# Patient Record
Sex: MALE | Race: BLACK OR AFRICAN AMERICAN | NOT HISPANIC OR LATINO | URBAN - METROPOLITAN AREA
[De-identification: names, ages, dates, MRNs, and addresses within clinical notes are randomized per-mention and may not be internally consistent; named-entity substitution may affect disease eponyms.]

---

## 2020-01-20 ENCOUNTER — EMERGENCY (EMERGENCY)
Facility: HOSPITAL | Age: 35
LOS: 1 days | End: 2020-01-20
Attending: STUDENT IN AN ORGANIZED HEALTH CARE EDUCATION/TRAINING PROGRAM
Payer: COMMERCIAL

## 2020-01-20 VITALS
DIASTOLIC BLOOD PRESSURE: 115 MMHG | OXYGEN SATURATION: 96 % | HEIGHT: 70 IN | WEIGHT: 195.11 LBS | RESPIRATION RATE: 20 BRPM | SYSTOLIC BLOOD PRESSURE: 172 MMHG | TEMPERATURE: 98 F | HEART RATE: 74 BPM

## 2020-01-20 VITALS
SYSTOLIC BLOOD PRESSURE: 119 MMHG | RESPIRATION RATE: 20 BRPM | OXYGEN SATURATION: 94 % | DIASTOLIC BLOOD PRESSURE: 63 MMHG | HEART RATE: 58 BPM | TEMPERATURE: 99 F

## 2020-01-20 LAB
ALBUMIN SERPL ELPH-MCNC: 4.4 G/DL — SIGNIFICANT CHANGE UP (ref 3.3–5)
ALP SERPL-CCNC: 42 U/L — SIGNIFICANT CHANGE UP (ref 40–120)
ALT FLD-CCNC: 22 U/L — SIGNIFICANT CHANGE UP (ref 10–45)
ANION GAP SERPL CALC-SCNC: 11 MMOL/L — SIGNIFICANT CHANGE UP (ref 5–17)
AST SERPL-CCNC: 28 U/L — SIGNIFICANT CHANGE UP (ref 10–40)
BASOPHILS # BLD AUTO: 0.01 K/UL — SIGNIFICANT CHANGE UP (ref 0–0.2)
BASOPHILS NFR BLD AUTO: 0.3 % — SIGNIFICANT CHANGE UP (ref 0–2)
BILIRUB SERPL-MCNC: 1.2 MG/DL — SIGNIFICANT CHANGE UP (ref 0.2–1.2)
BLD GP AB SCN SERPL QL: NEGATIVE — SIGNIFICANT CHANGE UP
BUN SERPL-MCNC: 9 MG/DL — SIGNIFICANT CHANGE UP (ref 7–23)
CALCIUM SERPL-MCNC: 9.6 MG/DL — SIGNIFICANT CHANGE UP (ref 8.4–10.5)
CHLORIDE SERPL-SCNC: 105 MMOL/L — SIGNIFICANT CHANGE UP (ref 96–108)
CO2 SERPL-SCNC: 30 MMOL/L — SIGNIFICANT CHANGE UP (ref 22–31)
CREAT SERPL-MCNC: 1.15 MG/DL — SIGNIFICANT CHANGE UP (ref 0.5–1.3)
EOSINOPHIL # BLD AUTO: 0.07 K/UL — SIGNIFICANT CHANGE UP (ref 0–0.5)
EOSINOPHIL NFR BLD AUTO: 1.8 % — SIGNIFICANT CHANGE UP (ref 0–6)
GLUCOSE SERPL-MCNC: 74 MG/DL — SIGNIFICANT CHANGE UP (ref 70–99)
HCT VFR BLD CALC: 44.9 % — SIGNIFICANT CHANGE UP (ref 39–50)
HCT VFR BLD CALC: 47.4 % — SIGNIFICANT CHANGE UP (ref 39–50)
HGB BLD-MCNC: 14.6 G/DL — SIGNIFICANT CHANGE UP (ref 13–17)
HGB BLD-MCNC: 15.1 G/DL — SIGNIFICANT CHANGE UP (ref 13–17)
IMM GRANULOCYTES NFR BLD AUTO: 0.3 % — SIGNIFICANT CHANGE UP (ref 0–1.5)
LYMPHOCYTES # BLD AUTO: 2.09 K/UL — SIGNIFICANT CHANGE UP (ref 1–3.3)
LYMPHOCYTES # BLD AUTO: 53.2 % — HIGH (ref 13–44)
MCHC RBC-ENTMCNC: 27.2 PG — SIGNIFICANT CHANGE UP (ref 27–34)
MCHC RBC-ENTMCNC: 31.9 GM/DL — LOW (ref 32–36)
MCV RBC AUTO: 85.4 FL — SIGNIFICANT CHANGE UP (ref 80–100)
MONOCYTES # BLD AUTO: 0.37 K/UL — SIGNIFICANT CHANGE UP (ref 0–0.9)
MONOCYTES NFR BLD AUTO: 9.4 % — SIGNIFICANT CHANGE UP (ref 2–14)
NEUTROPHILS # BLD AUTO: 1.38 K/UL — LOW (ref 1.8–7.4)
NEUTROPHILS NFR BLD AUTO: 35 % — LOW (ref 43–77)
NRBC # BLD: 0 /100 WBCS — SIGNIFICANT CHANGE UP (ref 0–0)
PLATELET # BLD AUTO: 191 K/UL — SIGNIFICANT CHANGE UP (ref 150–400)
POTASSIUM SERPL-MCNC: 4.1 MMOL/L — SIGNIFICANT CHANGE UP (ref 3.5–5.3)
POTASSIUM SERPL-SCNC: 4.1 MMOL/L — SIGNIFICANT CHANGE UP (ref 3.5–5.3)
PROT SERPL-MCNC: 7.2 G/DL — SIGNIFICANT CHANGE UP (ref 6–8.3)
RBC # BLD: 5.55 M/UL — SIGNIFICANT CHANGE UP (ref 4.2–5.8)
RBC # FLD: 12.5 % — SIGNIFICANT CHANGE UP (ref 10.3–14.5)
RH IG SCN BLD-IMP: POSITIVE — SIGNIFICANT CHANGE UP
SODIUM SERPL-SCNC: 146 MMOL/L — HIGH (ref 135–145)
WBC # BLD: 3.93 K/UL — SIGNIFICANT CHANGE UP (ref 3.8–10.5)
WBC # FLD AUTO: 3.93 K/UL — SIGNIFICANT CHANGE UP (ref 3.8–10.5)

## 2020-01-20 PROCEDURE — 86900 BLOOD TYPING SEROLOGIC ABO: CPT

## 2020-01-20 PROCEDURE — 85027 COMPLETE CBC AUTOMATED: CPT

## 2020-01-20 PROCEDURE — 74177 CT ABD & PELVIS W/CONTRAST: CPT | Mod: 26

## 2020-01-20 PROCEDURE — 86850 RBC ANTIBODY SCREEN: CPT

## 2020-01-20 PROCEDURE — 85014 HEMATOCRIT: CPT

## 2020-01-20 PROCEDURE — 99284 EMERGENCY DEPT VISIT MOD MDM: CPT

## 2020-01-20 PROCEDURE — 99284 EMERGENCY DEPT VISIT MOD MDM: CPT | Mod: 25

## 2020-01-20 PROCEDURE — 80053 COMPREHEN METABOLIC PANEL: CPT

## 2020-01-20 PROCEDURE — 85018 HEMOGLOBIN: CPT

## 2020-01-20 PROCEDURE — 86901 BLOOD TYPING SEROLOGIC RH(D): CPT

## 2020-01-20 PROCEDURE — 74177 CT ABD & PELVIS W/CONTRAST: CPT

## 2020-01-20 RX ORDER — AER TRAVELER 0.5 G/1
1 SOLUTION RECTAL; TOPICAL ONCE
Refills: 0 | Status: DISCONTINUED | OUTPATIENT
Start: 2020-01-20 | End: 2020-02-06

## 2020-01-20 RX ORDER — IBUPROFEN 200 MG
400 TABLET ORAL ONCE
Refills: 0 | Status: COMPLETED | OUTPATIENT
Start: 2020-01-20 | End: 2020-01-20

## 2020-01-20 RX ORDER — ACETAMINOPHEN 500 MG
650 TABLET ORAL ONCE
Refills: 0 | Status: COMPLETED | OUTPATIENT
Start: 2020-01-20 | End: 2020-01-20

## 2020-01-20 RX ADMIN — Medication 400 MILLIGRAM(S): at 12:37

## 2020-01-20 RX ADMIN — Medication 650 MILLIGRAM(S): at 12:37

## 2020-01-20 NOTE — ED PROVIDER NOTE - PROGRESS NOTE DETAILS
Patient evaluated by surgery at the bedside, recommend CT abd/pelv with IV contrast to r/o intrasphincteric abscess. If no abscess can be d/c to follow up with Dr. Dyer in the clinic on Wednesday as earliest can get surgery is on Thursday. CT without abscess, repeat hgb 14.6, will d/c home for f/u with surgery

## 2020-01-20 NOTE — ED PROVIDER NOTE - CLINICAL SUMMARY MEDICAL DECISION MAKING FREE TEXT BOX
34 year old male with prior colonoscopy revealing rectal hemorrhoids x1 year ago at [facility], unknown doctor. Pt presents to the ED c/o x5 days of rectal discomfort and bleeding consistent with prior hemorrhoids. Pt denies SOB, CP, feels as if he is going to pass out. Pt has been applying preparation H to the area and taking Tylenol as needed for symptoms, last dose at 0600 today. States he has had a throbbing pain to the rectal area but no diarrhea or constipation. On exam, pt has multiple external hemorrhoids, one with mild bleeding. Will obtain labs and check hemoglobin to determine if he needs a blood transfusion. If levels are normal, will DC pt home with out-pt colorectal follow-up.

## 2020-01-20 NOTE — ED PROVIDER NOTE - PHYSICAL EXAMINATION
GENERAL: Awake, alert, NAD  HEENT: NC/AT, moist mucous membranes, PERRL, EOMI  LUNGS: CTAB, no wheezes or crackles   CARDIAC: RRR, no m/r/g  ABDOMEN: Soft, normal BS, non tender, non distended, no rebound, no guarding  BACK: No midline spinal tenderness, no CVA tenderness  EXT: No edema, no calf tenderness, 2+ DP pulses bilaterally, no deformities.  RECTAL: prolapsed hemorrhoid through anus with some active bleeeding  NEURO: A&Ox3. Moving all extremities.  SKIN: Warm and dry. No rash.  PSYCH: Normal affect.

## 2020-01-20 NOTE — ED PROVIDER NOTE - NS ED ROS FT
CONST: no fevers, no chills  EYES: no pain, no vision changes  ENT: no sore throat, no ear pain, no change in hearing  CV: no chest pain, no leg swelling  RESP: no shortness of breath, no cough  ABD: no abdominal pain, no nausea, no vomiting, no diarrhea  : no dysuria, no flank pain, no hematuria  MSK: no back pain, no extremity pain  NEURO: no headache or additional neurologic complaints  SKIN:  no rash

## 2020-01-20 NOTE — CONSULT NOTE ADULT - SUBJECTIVE AND OBJECTIVE BOX
SURGERY CONSULT NOTE    Chief Complaint: Rectal pain and bleeding  HPI: 34M otherwise healthy male comes in with rectal pain and bleeding since Thursday (3 days ago). States that he has known hemorrhoids for 5-6 years, had a colonoscopy 1 year ago which was otherwise normal, and has only taken one suppository which made him feel unwell so stopped. Never seen a surgeon nor undertaken any other therapies for his hemorrhoids. Today, states that the hemorrhoids are already getting smaller, still painful but tolerable with the PO Meds in the ED, and the bleeding has now stopped. No associated symptoms, no HA, CP, SOB, N/V/D/C.    PAST MEDICAL & SURGICAL HISTORY: None    MEDICATIONS  (STANDING):  witch hazel Pads 1 Application(s) Topical Once    MEDICATIONS  (PRN):    Allergies    crustaceans (Anaphylaxis)  No Known Drug Allergies    Intolerances    SOCIAL HISTORY   Smoking: No  ETOH: Socially  Drugs: None  Other:     FAMILY HISTORY:    Vital Signs Last 24 Hrs  T(C): 36.8 (20 Jan 2020 12:19), Max: 36.8 (20 Jan 2020 12:19)  T(F): 98.2 (20 Jan 2020 12:19), Max: 98.2 (20 Jan 2020 12:19)  HR: 53 (20 Jan 2020 12:19) (53 - 74)  BP: 143/87 (20 Jan 2020 12:19) (143/87 - 172/115)  BP(mean): --  RR: 18 (20 Jan 2020 12:19) (18 - 20)  SpO2: 99% (20 Jan 2020 12:19) (96% - 99%)      Physical Exam    PHYSICAL EXAM:  General: NAD, Lying in bed comfortably  Neuro: AAO  HEENT: NC/AT, EOMI  Cardio: NSR  Resp: Good effort, no distress  GI/Abd: Soft, NTND, no rebound/guarding, no masses palpated  Vascular: B/l radial pulses palpable  Skin: Intact, no breakdown  Lymphatic/Nodes: No palpable lymphadenopathy  Musculoskeletal: All 4 extremities moving spontaneously, no limitations  Rectal: External hemorrhoids, with one small prolapsed internal hemorrhoids at 9:00 position. Tender. No active bleeding; internal hemorrhoids dark red. Nonreducible. BRITTNEY with moderate tenderness, no areas of fluctuance. No fissures palpated.    LABS                        15.1   3.93  )-----------( 191      ( 20 Jan 2020 12:33 )             47.4     01-20    146<H>  |  105  |  9   ----------------------------<  74  4.1   |  30  |  1.15    Ca    9.6      20 Jan 2020 12:33    TPro  7.2  /  Alb  4.4  /  TBili  1.2  /  DBili  x   /  AST  28  /  ALT  22  /  AlkPhos  42  01-20    LIVER FUNCTIONS - ( 20 Jan 2020 12:33 )  Alb: 4.4 g/dL / Pro: 7.2 g/dL / ALK PHOS: 42 U/L / ALT: 22 U/L / AST: 28 U/L / GGT: x             RADIOLOGY & ADDITIONAL STUDIES:    IMPRESSION:     No evidence of rectal/anal abscess.    Assessment/Plan: 34M with small prolapsed internal hemorrhoid + external hemorrhoids    CT without evidence of abscess  Patient can follow-up in Dr. Dyer's office this Wednesday with plans for elective hemorrhoidectomy Thursday.  Would recommend topical agents for pain, stool softeners, sitz baths

## 2020-01-20 NOTE — ED PROVIDER NOTE - NSFOLLOWUPINSTRUCTIONS_ED_ALL_ED_FT
Follow up with Dr. Dyer in 2 days. Use tylenol 650mg and ibuprofen 600mg every 6 hours for pain. Use sitz baths and warm compresses. Take stool softeners. Return to the ED for worsening pain, bleeding, nausea, vomiting, or other concerning symptoms.

## 2020-01-20 NOTE — ED ADULT NURSE NOTE - OBJECTIVE STATEMENT
Pt 33 y/o male AxOx3 presents to ED complaining of rectal bleeding worsening since thursday. Pt reports noting bright red blood constantly in underwear, worsening with having a bowel movement. Pt denies PMH. Pt endorses dizziness and lughtheadedness, also rating pain 9/10 with last tylenol last night with mild relief. Pt is well appearing, speaking full sentences without difficulty. Breathing spontaneous and unlabored. Upon assessment, abdomen soft and nontender, +strong peripheral pulses, moving all extremities without difficulty, lungs clear. Pt denies CP, SOB, HA, vision changes, n/v/d, fevers chills, abdominal pain. Safety and comfort measures initiated- bed placed in lowest position and side rails raised. Pt oriented to call bell system.

## 2020-01-20 NOTE — ED ADULT NURSE NOTE - NSIMPLEMENTINTERV_GEN_ALL_ED
Implemented All Universal Safety Interventions:  [place] to call system. Call bell, personal items and telephone within reach. Instruct patient to call for assistance. Room bathroom lighting operational. Non-slip footwear when patient is off stretcher. Physically safe environment: no spills, clutter or unnecessary equipment. Stretcher in lowest position, wheels locked, appropriate side rails in place.

## 2020-01-20 NOTE — ED ADULT NURSE REASSESSMENT NOTE - NS ED NURSE REASSESS COMMENT FT1
Pt sitting up in stretcher conversing with RN without difficulty. Awaiting dispo at this time. No acute distress noted.

## 2020-01-20 NOTE — ED PROVIDER NOTE - PATIENT PORTAL LINK FT
You can access the FollowMyHealth Patient Portal offered by [facility] by registering at the following website: [email or web address]. By joining [employer]’s FollowMyHealth portal, you will also be able to view your health information using other applications (apps) compatible with our system.

## 2020-01-20 NOTE — ED PROVIDER NOTE - OBJECTIVE STATEMENT
35 y/o male patient with hx hemorrhoids presents to the ED c/o rectal bleeding onset 4 days ago. Reports multiple episodes of blood seeping through his underwear that occur with and without bowel movements.. Was diagnosed with multiple hemorrhoids on colonoscopy 1 year ago but has never had this much bleeding. Tried using tylenol, motrin, and preparation h without relief. No dysuria, hematuria, abdominal pain.

## 2020-01-21 PROBLEM — Z00.00 ENCOUNTER FOR PREVENTIVE HEALTH EXAMINATION: Status: ACTIVE | Noted: 2020-01-21

## 2020-01-22 ENCOUNTER — APPOINTMENT (OUTPATIENT)
Dept: SURGERY | Facility: CLINIC | Age: 35
End: 2020-01-22
Payer: COMMERCIAL

## 2020-01-22 VITALS
OXYGEN SATURATION: 98 % | HEIGHT: 70 IN | DIASTOLIC BLOOD PRESSURE: 88 MMHG | BODY MASS INDEX: 27.2 KG/M2 | RESPIRATION RATE: 16 BRPM | HEART RATE: 98 BPM | TEMPERATURE: 98.4 F | SYSTOLIC BLOOD PRESSURE: 147 MMHG | WEIGHT: 190 LBS

## 2020-01-22 DIAGNOSIS — Z83.79 FAMILY HISTORY OF OTHER DISEASES OF THE DIGESTIVE SYSTEM: ICD-10-CM

## 2020-01-22 DIAGNOSIS — Z82.49 FAMILY HISTORY OF ISCHEMIC HEART DISEASE AND OTHER DISEASES OF THE CIRCULATORY SYSTEM: ICD-10-CM

## 2020-01-22 PROCEDURE — 99204 OFFICE O/P NEW MOD 45 MIN: CPT

## 2020-01-27 NOTE — HISTORY OF PRESENT ILLNESS
[FreeTextEntry1] : Carlitos is a 33 y/o male with hemorrhoidal disease. He presented to [facility] ED in 01/20/2020 with complaints of rectal pain and bleeding. CT abdomen and pelvis from 01/20/2020: no evidence of perianal abscess. \par Today, the patient reports long-standing c/o excess perianal tissue, rectal pain and bleeding, worse lately. Taking OTC medication with some relief. Tolerating high fiber diet. Reports BRBPR, dripping into the toilet bowel. Bleeding persistent over the past week. Denies lightheadedness, dyspnea.

## 2020-01-27 NOTE — CONSULT LETTER
[Dear  ___] : Dear  [unfilled], [Consult Letter:] : I had the pleasure of evaluating your patient, [unfilled]. [Please see my note below.] : Please see my note below. [Consult Closing:] : Thank you very much for allowing me to participate in the care of this patient.  If you have any questions, please do not hesitate to contact me. [Sincerely,] : Sincerely, [DrBridget  ___] : Dr. WILSON [FreeTextEntry2] : Dr Estee Ramirez [FreeTextEntry3] : Fozia Chao M.D., F.A.C.S., F.MAHSA.S.C.R.S.\par Assistant Professor of Surgery\par [employer] [facility]\par \par

## 2020-01-27 NOTE — ASSESSMENT
[FreeTextEntry1] : 33 yo male with symptomatic external and internal prolapsing (grade IV) hemorrhoids. In order to treat his disease, he needs an excisional hemorrhoidectomy, which unfortunately has a painful recovery. I also recommended Botox injection given the findings of sphincter spasm. I discussed the details, risks, benefits and alternatives of the procedure and expectations of recovery with the patient. I discussed that I will be using Exparel for postop analgesia which at least will help with pain control for up to the first 3 days postop.\par He will schedule the surgery with my office.\par

## 2020-01-27 NOTE — PHYSICAL EXAM
[FreeTextEntry1] : This is a 34 year-old well-developed male in no apparent distress.\par \par HEENT normocephalic, anicteric, external ears normal bilaterally, EOMs intact.\par \par Cardiac - regular rate and rhythm.\par \par Abdomen soft, nontender, nondistended, no masses. No hepatosplenomegaly.\par \par No inguinal lymphadenopathy bilaterally.\par \par Examination of the perineum reveals circumferential external hemorrhoids and grade IV prolapsing internal, friable hemorrhoids. Digital rectal examination reveals increased sphincter tone and is uncomfortable for the pt. \par Anoscopy not done d/t the significant pt's discomfort.  \par \par Neuro-cranial nerves grossly intact. Normal gait.\par \par Psychiatric-oriented to time place and person. Good understanding of conversation.\par

## 2020-01-30 ENCOUNTER — OUTPATIENT (OUTPATIENT)
Dept: OUTPATIENT SERVICES | Facility: HOSPITAL | Age: 35
LOS: 1 days | End: 2020-01-30
Payer: COMMERCIAL

## 2020-01-30 VITALS
DIASTOLIC BLOOD PRESSURE: 78 MMHG | HEIGHT: 70 IN | TEMPERATURE: 98 F | OXYGEN SATURATION: 99 % | WEIGHT: 194.01 LBS | HEART RATE: 15 BPM | RESPIRATION RATE: 16 BRPM | SYSTOLIC BLOOD PRESSURE: 124 MMHG

## 2020-01-30 DIAGNOSIS — Z01.818 ENCOUNTER FOR OTHER PREPROCEDURAL EXAMINATION: ICD-10-CM

## 2020-01-30 DIAGNOSIS — Z98.890 OTHER SPECIFIED POSTPROCEDURAL STATES: Chronic | ICD-10-CM

## 2020-01-30 DIAGNOSIS — K64.9 UNSPECIFIED HEMORRHOIDS: ICD-10-CM

## 2020-01-30 DIAGNOSIS — K64.8 OTHER HEMORRHOIDS: ICD-10-CM

## 2020-01-30 PROCEDURE — G0463: CPT

## 2020-01-30 RX ORDER — LIDOCAINE HCL 20 MG/ML
0.2 VIAL (ML) INJECTION ONCE
Refills: 0 | Status: DISCONTINUED | OUTPATIENT
Start: 2020-02-05 | End: 2020-03-04

## 2020-01-30 RX ORDER — SODIUM CHLORIDE 9 MG/ML
3 INJECTION INTRAMUSCULAR; INTRAVENOUS; SUBCUTANEOUS EVERY 8 HOURS
Refills: 0 | Status: DISCONTINUED | OUTPATIENT
Start: 2020-02-05 | End: 2020-03-04

## 2020-01-30 NOTE — H&P PST ADULT - NSANTHOSAYNRD_GEN_A_CORE
No. MARIO screening performed.  STOP BANG Legend: 0-2 = LOW Risk; 3-4 = INTERMEDIATE Risk; 5-8 = HIGH Risk

## 2020-01-30 NOTE — H&P PST ADULT - HISTORY OF PRESENT ILLNESS
34 year old male with pain, discomfort & constipation due to hemorrhoids for 5 years,  noticed its getting worse, s/p surgical consult, scheduled for hemorrhoidectomy on 02/05/2020. 34 year old male with pain, discomfort in anal region & constipation due to hemorrhoids for 5 years,  noticed its getting worse, s/p surgical consult, scheduled for hemorrhoidectomy on 02/05/2020.

## 2020-01-30 NOTE — H&P PST ADULT - NSICDXPROBLEM_GEN_ALL_CORE_FT
PROBLEM DIAGNOSES  Problem: Hemorrhoids  Assessment and Plan: Excisional hemorrhoidectomy with botox injection

## 2020-02-04 ENCOUNTER — TRANSCRIPTION ENCOUNTER (OUTPATIENT)
Age: 35
End: 2020-02-04

## 2020-02-05 ENCOUNTER — RESULT REVIEW (OUTPATIENT)
Age: 35
End: 2020-02-05

## 2020-02-05 ENCOUNTER — OUTPATIENT (OUTPATIENT)
Dept: OUTPATIENT SERVICES | Facility: HOSPITAL | Age: 35
LOS: 1 days | End: 2020-02-05
Payer: COMMERCIAL

## 2020-02-05 ENCOUNTER — APPOINTMENT (OUTPATIENT)
Dept: SURGERY | Facility: HOSPITAL | Age: 35
End: 2020-02-05
Payer: COMMERCIAL

## 2020-02-05 VITALS
OXYGEN SATURATION: 100 % | DIASTOLIC BLOOD PRESSURE: 81 MMHG | WEIGHT: 194.01 LBS | SYSTOLIC BLOOD PRESSURE: 133 MMHG | HEART RATE: 50 BPM | HEIGHT: 70 IN | TEMPERATURE: 98 F | RESPIRATION RATE: 14 BRPM

## 2020-02-05 VITALS
OXYGEN SATURATION: 97 % | RESPIRATION RATE: 14 BRPM | HEART RATE: 62 BPM | DIASTOLIC BLOOD PRESSURE: 82 MMHG | SYSTOLIC BLOOD PRESSURE: 136 MMHG

## 2020-02-05 DIAGNOSIS — K64.8 OTHER HEMORRHOIDS: ICD-10-CM

## 2020-02-05 DIAGNOSIS — Z98.890 OTHER SPECIFIED POSTPROCEDURAL STATES: Chronic | ICD-10-CM

## 2020-02-05 DIAGNOSIS — Z01.818 ENCOUNTER FOR OTHER PREPROCEDURAL EXAMINATION: ICD-10-CM

## 2020-02-05 PROCEDURE — 46260 REMOVE IN/EX HEM GROUPS 2+: CPT

## 2020-02-05 PROCEDURE — 46505 CHEMODENERVATION ANAL MUSC: CPT

## 2020-02-05 PROCEDURE — 88304 TISSUE EXAM BY PATHOLOGIST: CPT

## 2020-02-05 PROCEDURE — C1889: CPT

## 2020-02-05 PROCEDURE — 88304 TISSUE EXAM BY PATHOLOGIST: CPT | Mod: 26

## 2020-02-05 RX ORDER — ACETAMINOPHEN 500 MG
1000 TABLET ORAL ONCE
Refills: 0 | Status: COMPLETED | OUTPATIENT
Start: 2020-02-05 | End: 2020-02-05

## 2020-02-05 RX ORDER — ONDANSETRON 8 MG/1
4 TABLET, FILM COATED ORAL ONCE
Refills: 0 | Status: DISCONTINUED | OUTPATIENT
Start: 2020-02-05 | End: 2020-03-04

## 2020-02-05 RX ORDER — HYDROMORPHONE HYDROCHLORIDE 2 MG/ML
0.5 INJECTION INTRAMUSCULAR; INTRAVENOUS; SUBCUTANEOUS
Refills: 0 | Status: DISCONTINUED | OUTPATIENT
Start: 2020-02-05 | End: 2020-02-05

## 2020-02-05 RX ORDER — SODIUM CHLORIDE 9 MG/ML
1000 INJECTION, SOLUTION INTRAVENOUS
Refills: 0 | Status: DISCONTINUED | OUTPATIENT
Start: 2020-02-05 | End: 2020-03-04

## 2020-02-05 RX ORDER — OXYCODONE HYDROCHLORIDE 5 MG/1
1 TABLET ORAL
Qty: 20 | Refills: 0
Start: 2020-02-05 | End: 2020-02-09

## 2020-02-05 RX ORDER — CELECOXIB 200 MG/1
200 CAPSULE ORAL ONCE
Refills: 0 | Status: COMPLETED | OUTPATIENT
Start: 2020-02-05 | End: 2020-02-05

## 2020-02-05 RX ORDER — ONDANSETRON 8 MG/1
4 TABLET, FILM COATED ORAL ONCE
Refills: 0 | Status: COMPLETED | OUTPATIENT
Start: 2020-02-05 | End: 2020-02-05

## 2020-02-05 RX ORDER — OXYCODONE AND ACETAMINOPHEN 5; 325 MG/1; MG/1
1 TABLET ORAL EVERY 4 HOURS
Refills: 0 | Status: DISCONTINUED | OUTPATIENT
Start: 2020-02-05 | End: 2020-02-05

## 2020-02-05 RX ADMIN — HYDROMORPHONE HYDROCHLORIDE 0.5 MILLIGRAM(S): 2 INJECTION INTRAMUSCULAR; INTRAVENOUS; SUBCUTANEOUS at 11:00

## 2020-02-05 RX ADMIN — OXYCODONE AND ACETAMINOPHEN 1 TABLET(S): 5; 325 TABLET ORAL at 11:00

## 2020-02-05 RX ADMIN — HYDROMORPHONE HYDROCHLORIDE 0.5 MILLIGRAM(S): 2 INJECTION INTRAMUSCULAR; INTRAVENOUS; SUBCUTANEOUS at 11:05

## 2020-02-05 RX ADMIN — OXYCODONE AND ACETAMINOPHEN 1 TABLET(S): 5; 325 TABLET ORAL at 10:00

## 2020-02-05 RX ADMIN — Medication 1000 MILLIGRAM(S): at 06:16

## 2020-02-05 RX ADMIN — HYDROMORPHONE HYDROCHLORIDE 0.5 MILLIGRAM(S): 2 INJECTION INTRAMUSCULAR; INTRAVENOUS; SUBCUTANEOUS at 11:10

## 2020-02-05 RX ADMIN — HYDROMORPHONE HYDROCHLORIDE 0.5 MILLIGRAM(S): 2 INJECTION INTRAMUSCULAR; INTRAVENOUS; SUBCUTANEOUS at 10:50

## 2020-02-05 RX ADMIN — CELECOXIB 200 MILLIGRAM(S): 200 CAPSULE ORAL at 06:16

## 2020-02-05 RX ADMIN — ONDANSETRON 4 MILLIGRAM(S): 8 TABLET, FILM COATED ORAL at 10:05

## 2020-02-05 NOTE — BRIEF OPERATIVE NOTE - NSICDXBRIEFPROCEDURE_GEN_ALL_CORE_FT
PROCEDURES:  Injection of Botox into anus 05-Feb-2020 09:47:35  Servando Lainez  Hemorrhoidectomy, internal and external, involving 2 or more anal columns 05-Feb-2020 09:47:12  Servando Lainez

## 2020-02-05 NOTE — BRIEF OPERATIVE NOTE - NSICDXBRIEFPREOP_GEN_ALL_CORE_FT
PRE-OP DIAGNOSIS:  Internal and external prolapsed hemorrhoids 05-Feb-2020 09:48:20  Servando Lainez  Anal sphincter spasm 05-Feb-2020 09:48:04  Servando Lainez

## 2020-02-05 NOTE — ASU DISCHARGE PLAN (ADULT/PEDIATRIC) - NURSING INSTRUCTIONS
You were given TYLENOL for pain management at 6 AM. Please DO NOT take any products containing TYLENOL or ACETAMINOPHEN, such as VICODIN, PERCOCET, EXCEDRIN, and any over-the-counter cold medication for the next 6 hours (until 12 noon). DO NOT TAKE MORE THAN 3000 MG OF TYLENOL in a 24 hour period.  Next dose of NSAIDS (Motrin/Alleve) will be tomorrow if needed for pain/cramps.

## 2020-02-05 NOTE — ASU DISCHARGE PLAN (ADULT/PEDIATRIC) - CARE PROVIDER_API CALL
Fozia Chao)  ColonRectal Surgery; Surgery  [address], [address]  [place] [address]  Phone: [phone]  Fax: [phone]  Follow Up Time:
fall

## 2020-02-11 PROBLEM — K64.9 UNSPECIFIED HEMORRHOIDS: Chronic | Status: ACTIVE | Noted: 2020-01-30

## 2020-02-11 LAB — SURGICAL PATHOLOGY STUDY: SIGNIFICANT CHANGE UP

## 2020-02-12 ENCOUNTER — APPOINTMENT (OUTPATIENT)
Dept: SURGERY | Facility: CLINIC | Age: 35
End: 2020-02-12
Payer: COMMERCIAL

## 2020-02-12 VITALS
DIASTOLIC BLOOD PRESSURE: 95 MMHG | TEMPERATURE: 98.4 F | RESPIRATION RATE: 16 BRPM | HEART RATE: 52 BPM | OXYGEN SATURATION: 99 % | SYSTOLIC BLOOD PRESSURE: 153 MMHG

## 2020-02-12 DIAGNOSIS — Z78.9 OTHER SPECIFIED HEALTH STATUS: ICD-10-CM

## 2020-02-12 PROCEDURE — 99024 POSTOP FOLLOW-UP VISIT: CPT

## 2020-02-26 ENCOUNTER — APPOINTMENT (OUTPATIENT)
Dept: SURGERY | Facility: CLINIC | Age: 35
End: 2020-02-26
Payer: COMMERCIAL

## 2020-02-26 PROBLEM — Z78.9 NO PERTINENT PAST MEDICAL HISTORY: Status: RESOLVED | Noted: 2020-01-27 | Resolved: 2020-02-26

## 2020-02-26 NOTE — ASSESSMENT
[FreeTextEntry1] : Doing well postop.\par Path discussed. \par Instructions for diet, activity, wound care given, all questions answered. Advised to take fiber supplements twice a day x 3 months. Try to wean off Miralax as tolerated. \par RTO in 3 weeks.\par

## 2020-02-26 NOTE — HISTORY OF PRESENT ILLNESS
[FreeTextEntry1] : Today the pt reports rectal pain. having 2-3 soft, bloody stools a day. Taking Miralax, MM and mineral oil daily. Doing sitz baths 2-3 times a day with some relief. Denies fever, chills. Takes Oxy with good relief. + bloody discharge with BMs.

## 2020-02-26 NOTE — PHYSICAL EXAM
[FreeTextEntry1] : In NAD.\par Perineum with clean, pink wounds and mild postop (expected) tissue swelling. Excess sutures trimmed off.\par \par

## 2020-02-26 NOTE — REASON FOR VISIT
[Post Op: _________] : a [unfilled] post op visit [FreeTextEntry1] : Hemorrhoidectomy, Botox injection 02/05/2020

## 2020-03-02 ENCOUNTER — APPOINTMENT (OUTPATIENT)
Dept: SURGERY | Facility: CLINIC | Age: 35
End: 2020-03-02
Payer: COMMERCIAL

## 2020-03-04 ENCOUNTER — APPOINTMENT (OUTPATIENT)
Dept: SURGERY | Facility: CLINIC | Age: 35
End: 2020-03-04
Payer: COMMERCIAL

## 2020-03-04 VITALS
WEIGHT: 198 LBS | HEIGHT: 70 IN | OXYGEN SATURATION: 97 % | HEART RATE: 76 BPM | DIASTOLIC BLOOD PRESSURE: 91 MMHG | BODY MASS INDEX: 28.35 KG/M2 | SYSTOLIC BLOOD PRESSURE: 144 MMHG | TEMPERATURE: 98.3 F | RESPIRATION RATE: 16 BRPM

## 2020-03-04 DIAGNOSIS — Z09 ENCOUNTER FOR FOLLOW-UP EXAMINATION AFTER COMPLETED TREATMENT FOR CONDITIONS OTHER THAN MALIGNANT NEOPLASM: ICD-10-CM

## 2020-03-04 PROCEDURE — 99024 POSTOP FOLLOW-UP VISIT: CPT

## 2020-03-10 PROBLEM — Z09 POSTOPERATIVE EXAMINATION: Status: ACTIVE | Noted: 2020-02-26

## 2020-03-10 NOTE — ASSESSMENT
[FreeTextEntry1] : Doing well postop.\par Instructed to increase the fiber supplements to twice a day to minimize the formation of scar tissue. \par RTO in 6 weeks.\par

## 2020-03-10 NOTE — HISTORY OF PRESENT ILLNESS
[FreeTextEntry1] : Today the pt reports no pain and minimal bloody discharge. Feels some difficulty in evacuating. Taking Metamucil once a day.

## 2020-03-10 NOTE — PHYSICAL EXAM
[FreeTextEntry1] : In NAD.\par Perineum with very small residual clean, pink wounds. BRITTNEY no stenosis, admits easily one finger. \par \par

## 2020-06-03 ENCOUNTER — APPOINTMENT (OUTPATIENT)
Dept: SURGERY | Facility: CLINIC | Age: 35
End: 2020-06-03
Payer: COMMERCIAL

## 2020-06-03 VITALS
HEART RATE: 50 BPM | RESPIRATION RATE: 18 BRPM | TEMPERATURE: 99 F | OXYGEN SATURATION: 99 % | DIASTOLIC BLOOD PRESSURE: 79 MMHG | SYSTOLIC BLOOD PRESSURE: 123 MMHG

## 2020-06-03 DIAGNOSIS — K64.8 OTHER HEMORRHOIDS: ICD-10-CM

## 2020-06-03 DIAGNOSIS — K59.4 ANAL SPASM: ICD-10-CM

## 2020-06-03 PROCEDURE — 99212 OFFICE O/P EST SF 10 MIN: CPT

## 2020-06-03 RX ORDER — PSYLLIUM SEED (WITH DEXTROSE)
POWDER (GRAM) ORAL
Refills: 0 | Status: ACTIVE | COMMUNITY

## 2020-06-16 NOTE — ED PROVIDER NOTE - CARE PROVIDER_API CALL
Fozia Chao)  ColonRectal Surgery; Surgery  [address], [address]  [place] [address]  Phone: [phone]  Fax: [phone]  Scheduled Appointment: 01/22/2020 same as patient

## 2020-06-21 PROBLEM — K59.4 ANAL SPHINCTER SPASM: Status: RESOLVED | Noted: 2020-01-27 | Resolved: 2020-06-21

## 2020-06-21 NOTE — ASSESSMENT
[FreeTextEntry1] : Doing well.\par Instructed to continue the fiber supplements indefinitely. \par RTO as needed.\par

## 2020-06-21 NOTE — HISTORY OF PRESENT ILLNESS
[FreeTextEntry1] : DRAGAN MENDOZA is a 34 year old male being seen for a f/u visit for hemorrhoidal disease. He is S/P  Hemorrhoidectomy, Botox injection 02/05/2020. Last seen 03/04/20. Today pt reports feeling well. Denies pain, drainage, or constipation. Having daily BM once a day. Denies the use of laxatives. \par

## 2020-06-21 NOTE — PHYSICAL EXAM
[FreeTextEntry1] : In NAD.\par Perineum all wounds have healed. BRITTNEY no stenosis, admits easily one finger, tissues soft and normal. \par \par \par

## 2021-05-10 NOTE — H&P PST ADULT - TEMPERATURE IN CELSIUS (DEGREES C)
EXAMINATION TYPE: XR chest 2V

 

DATE OF EXAM: 5/10/2021

 

COMPARISON: 3/5/2021

 

HISTORY: Shortness of breath

 

TECHNIQUE:  Frontal and lateral views of the chest are obtained.

 

FINDINGS:

 

Scattered senescent parenchymal changes noted. Hyperinflation compatible with COPD. 

 

No evidence for infiltrate. No evidence for atelectasis.

 

Heart size is stable.

 

Mediastinal structures are stable and grossly unremarkable.

 

No evidence for hilar prominence.

 

Degenerative changes dorsal spine. 

 

IMPRESSION:

1. No evidence for acute pulmonary disease.
36.4

## 2021-09-16 NOTE — ED ADULT TRIAGE NOTE - PRO INTERPRETER NEED 2
COVID Vaccine    Type (Pfizer, Moderna, J&J): PFIZER    Date (Dose #1): 03/17/21  Lot #: [id number]  Location (i.e. pharmacy):    Date (Dose #2): 04/08/21  Lot #: [id number]  Location (i.e. pharmacy):   English

## 2022-11-27 NOTE — H&P PST ADULT - PSYCHIATRIC
negative  (normal spontaneous vaginal delivery) Affect and characteristics of appearance, verbalizations, behaviors are appropriate

## 2024-07-07 ENCOUNTER — NON-APPOINTMENT (OUTPATIENT)
Age: 39
End: 2024-07-07